# Patient Record
Sex: FEMALE | Race: OTHER | HISPANIC OR LATINO | URBAN - METROPOLITAN AREA
[De-identification: names, ages, dates, MRNs, and addresses within clinical notes are randomized per-mention and may not be internally consistent; named-entity substitution may affect disease eponyms.]

---

## 2021-11-22 ENCOUNTER — EMERGENCY (EMERGENCY)
Facility: HOSPITAL | Age: 21
LOS: 1 days | Discharge: HOME | End: 2021-11-22
Attending: STUDENT IN AN ORGANIZED HEALTH CARE EDUCATION/TRAINING PROGRAM
Payer: MEDICAID

## 2021-11-22 VITALS
SYSTOLIC BLOOD PRESSURE: 111 MMHG | DIASTOLIC BLOOD PRESSURE: 71 MMHG | RESPIRATION RATE: 16 BRPM | WEIGHT: 291.89 LBS | HEART RATE: 91 BPM | TEMPERATURE: 98 F | OXYGEN SATURATION: 98 %

## 2021-11-22 PROCEDURE — 99283 EMERGENCY DEPT VISIT LOW MDM: CPT

## 2021-11-22 PROCEDURE — 99284 EMERGENCY DEPT VISIT MOD MDM: CPT

## 2021-11-22 RX ORDER — EPINEPHRINE 0.3 MG/.3ML
0.3 INJECTION INTRAMUSCULAR; SUBCUTANEOUS
Qty: 2 | Refills: 0
Start: 2021-11-22 | End: 2021-11-22

## 2021-11-22 RX ADMIN — Medication 50 MILLIGRAM(S): at 16:17

## 2021-11-22 NOTE — ED PROVIDER NOTE - CLINICAL SUMMARY MEDICAL DECISION MAKING FREE TEXT BOX
20F presenting with allergic reaction. no signs of anaphylaxis. does not take any blood pressure medicine. rash along left arm appears like contact dermatitis. will give course of steroids.

## 2021-11-22 NOTE — ED ADULT NURSE NOTE - OBJECTIVE STATEMENT
pt is a 19 y/o female with c/o lip swelling after  pt seen and examined MD/ACP . Pt swab for COVID 19 and discharge  by MD/ACP 19

## 2021-11-22 NOTE — ED PROVIDER NOTE - NSFOLLOWUPINSTRUCTIONS_ED_ALL_ED_FT
You were seen in the emergency department for an allergic reaction.     Please follow-up with your primary care doctor in the next 24-48 hours.     Please finish your steroids.     If you have any worsening symptoms, severe headache, difficulty breathing, nausea, vomiting or you feel like your throat is closing, please return to the emergency department.

## 2021-11-22 NOTE — ED PROVIDER NOTE - PHYSICAL EXAMINATION
General: well appearing female, no acute distress   HEENT: angioedema   Respiratory: normal work of breathing  MSK: no swelling or tenderness of lower extremities, moving all extremities spontaneously   Skin: erythema along AC fossa of LUE  Neuro: A&Ox3  Psych: appropriate affect

## 2021-11-22 NOTE — ED PROVIDER NOTE - OBJECTIVE STATEMENT
20F, pmh of asthma, presenting with one week of lip swelling. patient reports symptoms began after getting covid vaccine. continues to have lip swelling and over the past two days developed a rash on the inside of her left arm. no difficulty breathing, nausea or vomiting.

## 2021-11-22 NOTE — ED PROVIDER NOTE - PATIENT PORTAL LINK FT
You can access the FollowMyHealth Patient Portal offered by [facility] by registering at the following website: [email or web address]. By joining [employer]’s FollowMyHealth portal, you will also be able to view your health information using other applications (apps) compatible with our system.

## 2022-08-29 ENCOUNTER — EMERGENCY (EMERGENCY)
Facility: HOSPITAL | Age: 22
LOS: 1 days | Discharge: HOME | End: 2022-08-29
Attending: STUDENT IN AN ORGANIZED HEALTH CARE EDUCATION/TRAINING PROGRAM
Payer: MEDICAID

## 2022-08-29 VITALS
HEART RATE: 79 BPM | TEMPERATURE: 98 F | SYSTOLIC BLOOD PRESSURE: 112 MMHG | RESPIRATION RATE: 16 BRPM | OXYGEN SATURATION: 99 % | WEIGHT: 289.91 LBS | DIASTOLIC BLOOD PRESSURE: 79 MMHG

## 2022-08-29 PROCEDURE — 29125 APPL SHORT ARM SPLINT STATIC: CPT | Mod: RT

## 2022-08-29 PROCEDURE — 29125 APPL SHORT ARM SPLINT STATIC: CPT

## 2022-08-29 PROCEDURE — 99283 EMERGENCY DEPT VISIT LOW MDM: CPT | Mod: 25

## 2022-08-29 RX ADMIN — Medication 500 MILLIGRAM(S): at 17:56

## 2022-08-29 NOTE — ED PROVIDER NOTE - OBJECTIVE STATEMENT
21 year old female with PMHx of obesity is presenting to the ED with chief complaint of right wrist pain x 1 week. Gradual onset of progressive worsening pain localized to proximal thumb and distal radial. Patient fel like she needs to snap her wrist but cannot do it. Pain is worse with certain movements especially when trying to grab stuff. No numbness and tingling at this time. No recent fall but have been going to the gym more recently. No repetitive movement at the computer. No focal weakness.

## 2022-08-29 NOTE — ED PROVIDER NOTE - PATIENT PORTAL LINK FT
You can access the FollowMyHealth Patient Portal offered by [facility] by registering at the following website: [email or web address]. By joining [employer]’s FollowMyHealth portal, you will also be able to view your health information using other applications (apps) compatible with our system.

## 2022-08-29 NOTE — ED PROVIDER NOTE - NS ED ATTENDING STATEMENT MOD
This was a shared visit with the LENNO[facility]. I reviewed and verified the documentation and independently performed the documented:

## 2022-08-29 NOTE — ED ADULT NURSE NOTE - NSIMPLEMENTINTERV_GEN_ALL_ED
Implemented All Universal Safety Interventions:  [place] to call system. Call bell, personal items and telephone within reach. Instruct patient to call for assistance. Room bathroom lighting operational. Non-slip footwear when patient is off stretcher. Physically safe environment: no spills, clutter or unnecessary equipment. Stretcher in lowest position, wheels locked, appropriate side rails in place.

## 2022-08-29 NOTE — ED PROVIDER NOTE - CLINICAL SUMMARY MEDICAL DECISION MAKING FREE TEXT BOX
21 year old female presents with right wrist pain x 1 week. Neurovascular intact. No signs of infection. Symptoms suggestive of tenosynovitis vs tendonitis. Thumb spic. naproxen, and PMD follow up. If no improvements in 7-10 days, will see orthopedics. 21 year old female presents with right wrist pain x 1 week. Neurovascular intact. No signs of infection. Symptoms suggestive of tenosynovitis vs tendonitis. Thumb spica. naproxen, and PMD follow up. If no improvements in 7-10 days, will see orthopedics.

## 2022-08-29 NOTE — ED PROVIDER NOTE - CHIEF COMPLAINT
Thank you for visiting [facility].  You may be receiving a very important survey in the mail over the next few weeks. Please help us improve your care by filling this out and returning it.   If you have MyChart, your results will be routed to you via that application and you will receive an e-mail notifying you of new results. If you do not have MyChart, a letter is generally mailed when results are available. If there is something more urgent that we need to contact you about, we will call.  If you have questions or concerns, please contact us via [employer] or you can contact your care team at [phone].  Our Clinic hours are:  Monday 7:00 am to 7:00 pm every other week and 5:00 pm on the opposite week  Tuesday 7:00 am to 5:00 pm  Wednesday 7:00 am to 7:00 pm every other week and 5:00 pm on the opposite week  Thursday 7:00 am to 5:00 pm   Friday 7:00 am to 5:00 pm  The [place] outpatient lab opens at 7:00 am Mon-Fri and 8:00am Sat. Appointments are required, call [phone].  If you have clinical questions after hours or would like to schedule an appointment, call the [facility] at [phone].     The patient is a 21y Female complaining of pain, wrist.

## 2022-08-29 NOTE — ED PROVIDER NOTE - SKIN, MLM
No erythema and durational streaking. Skin normal color for race, warm, dry and intact. No evidence of rash.

## 2022-08-29 NOTE — ED PROVIDER NOTE - ATTENDING APP SHARED VISIT CONTRIBUTION OF CARE
I was physically present for the E/M service provided. I agree with above history, physical, and plan which I have reviewed and edited where appropriate. I was physically present for the key portions of the service provided.     well appearing no acute distress, normal work of breathing.

## 2022-08-29 NOTE — ED PROVIDER NOTE - NSFOLLOWUPINSTRUCTIONS_ED_ALL_ED_FT
Follow up with the primary care doctor in 1 week. If pain persists or doesn't improve in 7-10 days you should follow up with the hand orthopedist.     If you experience any new or worsening symptoms or if you are concerned you can always come back to the emergency for a re-evaluation.    If there were any prescriptions given to you during the visit today take them as prescribed. If you have any questions you can ask the pharmacist.

## 2022-08-29 NOTE — ED PROVIDER NOTE - MUSCULOSKELETAL, MLM
Wrist with full range of motion without swelling. Distal radial tenderness to palpation. Positive finkelstein test.